# Patient Record
Sex: FEMALE | Race: WHITE | NOT HISPANIC OR LATINO | Employment: FULL TIME | URBAN - METROPOLITAN AREA
[De-identification: names, ages, dates, MRNs, and addresses within clinical notes are randomized per-mention and may not be internally consistent; named-entity substitution may affect disease eponyms.]

---

## 2017-02-07 ENCOUNTER — OFFICE VISIT (OUTPATIENT)
Dept: OTOLARYNGOLOGY | Facility: CLINIC | Age: 37
End: 2017-02-07
Payer: COMMERCIAL

## 2017-02-07 VITALS
SYSTOLIC BLOOD PRESSURE: 128 MMHG | HEART RATE: 83 BPM | DIASTOLIC BLOOD PRESSURE: 86 MMHG | TEMPERATURE: 99 F | WEIGHT: 209 LBS

## 2017-02-07 DIAGNOSIS — F17.200 SMOKER: ICD-10-CM

## 2017-02-07 DIAGNOSIS — R51.9 WORSENING HEADACHES: ICD-10-CM

## 2017-02-07 DIAGNOSIS — J30.89 PERENNIAL ALLERGIC RHINITIS, UNSPECIFIED ALLERGIC RHINITIS TRIGGER: Primary | ICD-10-CM

## 2017-02-07 PROCEDURE — 99999 PR PBB SHADOW E&M-NEW PATIENT-LVL III: CPT | Mod: PBBFAC,,, | Performed by: ORTHOPAEDIC SURGERY

## 2017-02-07 PROCEDURE — 99204 OFFICE O/P NEW MOD 45 MIN: CPT | Mod: S$GLB,,, | Performed by: ORTHOPAEDIC SURGERY

## 2017-02-07 RX ORDER — MEDROXYPROGESTERONE ACETATE 150 MG/ML
150 INJECTION, SUSPENSION INTRAMUSCULAR
COMMUNITY

## 2017-02-07 RX ORDER — IBUPROFEN 800 MG/1
800 TABLET ORAL
Refills: 0 | COMMUNITY
Start: 2017-02-01

## 2017-02-07 NOTE — MR AVS SNAPSHOT
[facility]  [address] [address]  Phone: [phone]  Fax: [phone]                  Marleny Guajardo   2017 7:45 AM   Office Visit    Description:  Female : 1980   Provider:  Sharon Chawla MD   Department:  [facility]           Reason for Visit     Sinus Problem           Diagnoses this Visit        Comments    Perennial allergic rhinitis, unspecified allergic rhinitis trigger    -  Primary     Worsening headaches         Smoker                To Do List           Future Appointments        Provider Department Dept Phone    2017 10:00 AM [facility]GY CLINIC, ENT [facility] [phone]    2017 11:30 AM Sharon Chawla MD [facility] [phone]      Goals (5 Years of Data)     None      Follow-Up and Disposition     Follow-up and Disposition History      [facility]     [facility] Care [facility] -  Assistance  Registered nurses in the [facility] provide clinical advisement, health education, appointment booking, and other advisory services.  Call for this free service at [phone].             Medications           Message regarding Medications     Verify the changes and/or additions to your medication regime listed below are the same as discussed with your clinician today.  If any of these changes or additions are incorrect, please notify your healthcare provider.             Verify that the below list of medications is an accurate representation of the medications you are currently taking.  If none reported, the list may be blank. If incorrect, please contact your healthcare provider. Carry this list with you in case of emergency.           Current Medications     GUAIFEN/PHENYLEPH/ACETAMINOPHN (TYLENOL SINUS SEVERE ORAL) Take by mouth as needed.    ibuprofen (ADVIL,MOTRIN) 800 MG tablet Take 800 mg by mouth as needed.    medroxyPROGESTERone (DEPO-PROVERA) 150 mg/mL injection Inject 150 mg into the muscle every 3 (three) months.            Clinical Reference Information           Your Vitals Were     BP Pulse Temp Weight          128/86 (BP Location: Left arm) 83 98.8 °F (37.1 °C) (Tympanic) 94.8 kg (208 lb 15.9 oz)        Blood Pressure          Most Recent Value    BP  128/86      Allergies as of 2/7/2017     No Known Allergies      Immunizations Administered on Date of Encounter - 2/7/2017     None      [facility] Sign-Up     Activating your [facility] account is as easy as 1-2-3!     1) Visit [email or web address], select Sign Up Now, enter this activation code and your date of birth, then select Next.  [email or web address]  Expires: 3/24/2017  8:36 AM      2) Create a username and password to use when you visit [facility] in the future and select a security question in case you lose your password and select Next.    3) Enter your e-mail address and click Sign Up!    Additional Information  If you have questions, please e-mail [email or web address] or call [phone] to talk to our [facility] staff. Remember, [facility] is NOT to be used for urgent needs. For medical emergencies, dial 911.         Smoking Cessation     If you would like to quit smoking:   You may be eligible for free services if you are a [place] resident and started smoking cigarettes before September 1, 1988.  Call the Smoking Cessation [employer] ([facility]) toll free at (239) [phone] or (613) 050-2755.   Call 1-800-QUIT-NOW if you do not meet the above criteria.            Language Assistance Services     ATTENTION: Language assistance services are available, free of charge. Please call [phone].      ATENCIÓN: Si habla español, tiene a mayers disposición servicios gratuitos de asistencia lingüística. Llame al [phone].     CHÚ Ý: N?u b?n nói Ti?ng Vi?t, có các d?ch v? h? tr? ngôn ng? mi?n phí dành cho b?n. G?i s? [phone].         [facility] complies with applicable Federal civil rights laws and does not discriminate on the basis of race, color, national origin, age,  disability, or sex.

## 2017-02-07 NOTE — PROGRESS NOTES
Subjective:       Patient ID: Marleny Guajardo is a 36 y.o. female.    Chief Complaint: Sinus Problem    HPI Comments: Patient is a very pleasant 36 year old female here to see me today for evaluation of her sinuses and allergies.  She has long had symptoms, for many years.  She is currently taking a multi-symptom OTC medication, but is still having symptoms.  Her primary concerns is nasal drainage and nasal obstruction.  She sneezes, but only at work.  She has symptoms nearly all year, and has been worse the last six months.  She has no pets at home.  She also notes severe headaches that she relates to her sinuses, had a bad headache yesterday afternoon.  She was on a nasal spray in the past, Flonase followed by an additional one, but did not find much benefit.  She has tried different OTC antihistamines, but has not found those to be helpful as well.  She has no history of asthma.  She has never had allergy testing done.  She would estimate that she requires antibiotics twice yearly for sinusitis, but has been over a year since she required (has been on frequent antibiotics for diverticulitis).  Her last imaging was last year at [facility] and per the patient it did not show any significant sinus disease at that time.  She does smoke, approximately 1/4 pack daily for 20 years.  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  She follows with [facility] as well for recent issues with vestibular neuritis of the left ear.    Review of Systems   Constitutional: Positive for fatigue. Negative for chills, fever and unexpected weight change.   HENT: Positive for congestion, hearing loss, postnasal drip, rhinorrhea and tinnitus. Negative for dental problem, ear discharge, ear pain, facial swelling, nosebleeds, sinus pressure, sneezing, sore throat, trouble swallowing and voice change.    Eyes: Negative for redness, itching and visual disturbance.    Respiratory: Positive for cough (when she lays down to go to bed). Negative for choking, shortness of breath and wheezing.    Cardiovascular: Negative for chest pain and palpitations.   Gastrointestinal: Negative for abdominal pain.        No reflux.   Musculoskeletal: Negative for gait problem.   Skin: Negative for rash.   Allergic/Immunologic: Positive for environmental allergies.   Neurological: Positive for dizziness, light-headedness and headaches.       Objective:      Physical Exam   Constitutional: She is oriented to person, place, and time. She appears well-developed and well-nourished. No distress.   HENT:   Head: Normocephalic and atraumatic.   Right Ear: Tympanic membrane, external ear and ear canal normal.   Left Ear: Tympanic membrane, external ear and ear canal normal.   Nose: Mucosal edema and rhinorrhea present. No nasal deformity or septal deviation. No epistaxis. Right sinus exhibits no maxillary sinus tenderness and no frontal sinus tenderness. Left sinus exhibits no maxillary sinus tenderness and no frontal sinus tenderness.   Mouth/Throat: Uvula is midline, oropharynx is clear and moist and mucous membranes are normal. Mucous membranes are not pale and not dry. No dental caries. No oropharyngeal exudate or posterior oropharyngeal erythema.   Eyes: Conjunctivae, EOM and lids are normal. Pupils are equal, round, and reactive to light. Right eye exhibits no chemosis. Left eye exhibits no chemosis. Right conjunctiva is not injected. Left conjunctiva is not injected. No scleral icterus. Right eye exhibits normal extraocular motion and no nystagmus. Left eye exhibits normal extraocular motion and no nystagmus.   Neck: Trachea normal and phonation normal. No tracheal tenderness present. No tracheal deviation present. No thyroid mass and no thyromegaly present.   Cardiovascular: Intact distal pulses.    Pulmonary/Chest: Effort normal. No stridor. No respiratory distress.   Abdominal: She exhibits no  distension.   Lymphadenopathy:        Head (right side): No submental, no submandibular, no preauricular, no posterior auricular and no occipital adenopathy present.        Head (left side): No submental, no submandibular, no preauricular, no posterior auricular and no occipital adenopathy present.     She has no cervical adenopathy.   Neurological: She is alert and oriented to person, place, and time. No cranial nerve deficit.   Skin: Skin is warm and dry. No rash noted. No erythema.   Psychiatric: She has a normal mood and affect. Her behavior is normal.       Assessment:       1. Perennial allergic rhinitis, unspecified allergic rhinitis trigger    2. Worsening headaches    3. Smoker        Plan:       1.  AR:  Patient has recently been on a trial of maximal medical therapy, but continues to have symptoms.  At this point, the next step would be to proceed with allergy skin testing.  Risks and benefits were discussed with the patient, including the risk of an allergic reaction to the testing.  In the most severe cases, this reaction could cause a life threatening anaphylaxis that would require treatment in clinic.  I reviewed her medications to ensure no contraindication to testing, and she will need to stop oral antihistamines prior.  The patient was given a handout with detailed instructions.  We also briefly discussed that pending the allergy testing results, she may be a candidate for specific immunotherapy.  There are different methods of desensitization therapy, including both subcutaneous and sublingual options.  Will discuss further once allergy testing is complete and results are available.  2.  Worsening headaches:  Will address allergies as above.  If headache continues after allergies are under better control, then may consider repeat evaluation with Neurology.  3.  Smoker:  Discussed with the patient the importance of stopping smoking, and that if they choose to continue to smoke it will make treating  their nasal drainage more difficult.  Smoking irritates the lining of the nose, increasing nasal secretions and swelling. The nose becomes less able to cleanse itself and more susceptible to allergens. Smoking and secondhand smoke is associated with significant nasal and sinus disease and symptoms.  I would recommend irrigation with hypertonic saline solution and daily use of a nasal steroid spray.  Unfortunately, sometimes permanent damage has been done to the nasal mucosa (similar to lung damage) and nasal drainage may not improve even after stopping smoking.  I would not recommend pursuing any sort of allergy testing or treatment while the patient is smoking, as it is unlikely to have significant benefit.

## 2017-02-22 ENCOUNTER — OFFICE VISIT (OUTPATIENT)
Dept: OTOLARYNGOLOGY | Facility: CLINIC | Age: 37
End: 2017-02-22
Payer: COMMERCIAL

## 2017-02-22 ENCOUNTER — CLINICAL SUPPORT (OUTPATIENT)
Dept: OTOLARYNGOLOGY | Facility: CLINIC | Age: 37
End: 2017-02-22
Payer: COMMERCIAL

## 2017-02-22 VITALS
TEMPERATURE: 98 F | HEIGHT: 65 IN | RESPIRATION RATE: 16 BRPM | BODY MASS INDEX: 34.82 KG/M2 | WEIGHT: 209 LBS | HEART RATE: 77 BPM | SYSTOLIC BLOOD PRESSURE: 128 MMHG | DIASTOLIC BLOOD PRESSURE: 90 MMHG

## 2017-02-22 DIAGNOSIS — F17.200 SMOKER: ICD-10-CM

## 2017-02-22 DIAGNOSIS — R51.9 WORSENING HEADACHES: ICD-10-CM

## 2017-02-22 DIAGNOSIS — J30.89 PERENNIAL ALLERGIC RHINITIS, UNSPECIFIED ALLERGIC RHINITIS TRIGGER: Primary | ICD-10-CM

## 2017-02-22 DIAGNOSIS — J30.9 ALLERGIC RHINITIS, UNSPECIFIED ALLERGIC RHINITIS TRIGGER, UNSPECIFIED RHINITIS SEASONALITY: ICD-10-CM

## 2017-02-22 PROCEDURE — 95024 IQ TESTS W/ALLERGENIC XTRCS: CPT | Mod: S$GLB,,, | Performed by: ORTHOPAEDIC SURGERY

## 2017-02-22 PROCEDURE — 99999 PR PBB SHADOW E&M-EST. PATIENT-LVL III: CPT | Mod: PBBFAC,,, | Performed by: ORTHOPAEDIC SURGERY

## 2017-02-22 PROCEDURE — 99999 PR PBB SHADOW E&M-EST. PATIENT-LVL I: CPT | Mod: PBBFAC,,,

## 2017-02-22 PROCEDURE — 95004 PERQ TESTS W/ALRGNC XTRCS: CPT | Mod: S$GLB,,, | Performed by: ORTHOPAEDIC SURGERY

## 2017-02-22 PROCEDURE — 99214 OFFICE O/P EST MOD 30 MIN: CPT | Mod: 25,S$GLB,, | Performed by: ORTHOPAEDIC SURGERY

## 2017-02-22 NOTE — PROGRESS NOTES
Subjective:       Patient ID: Marleny Guajardo is a 36 y.o. female.    Chief Complaint: Follow-up (Allergy Testing)    HPI Comments: Patient is a very pleasant 36 year old female here to see me today for evaluation of her sinuses and allergies.  She has long had symptoms, for many years.  She is currently taking a multi-symptom OTC medication, but is still having symptoms.  Her primary concerns is nasal drainage and nasal obstruction.  She sneezes, but only at work.  She has symptoms nearly all year, and has been worse the last six months.  She has no pets at home.  She also notes severe headaches that she relates to her sinuses, had a bad headache yesterday afternoon.  She was on a nasal spray in the past, Flonase followed by an additional one, but did not find much benefit.  She has tried different OTC antihistamines, but has not found those to be helpful as well.  She has no history of asthma.  She has never had allergy testing done.  She would estimate that she requires antibiotics twice yearly for sinusitis, but has been over a year since she required (has been on frequent antibiotics for diverticulitis).  Her last imaging was last year at [facility] and per the patient it did not show any significant sinus disease at that time.  She does smoke, approximately 1/4 pack daily for 20 years.  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  She follows with [facility] as well for recent issues with vestibular neuritis of the left ear.  She continues to have facial pressure as well as headaches.    Review of Systems   Constitutional: Positive for fatigue. Negative for chills, fever and unexpected weight change.   HENT: Positive for congestion, hearing loss, postnasal drip, rhinorrhea and tinnitus. Negative for dental problem, ear discharge, ear pain, facial swelling, nosebleeds, sinus pressure, sneezing, sore throat, trouble swallowing and voice change.     Eyes: Negative for redness, itching and visual disturbance.   Respiratory: Positive for cough (when she lays down to go to bed). Negative for choking, shortness of breath and wheezing.    Cardiovascular: Negative for chest pain and palpitations.   Gastrointestinal: Negative for abdominal pain.        No reflux.   Musculoskeletal: Negative for gait problem.   Skin: Negative for rash.   Allergic/Immunologic: Positive for environmental allergies.   Neurological: Positive for dizziness, light-headedness and headaches.       Objective:      Physical Exam   Constitutional: She is oriented to person, place, and time. She appears well-developed and well-nourished. No distress.   HENT:   Head: Normocephalic and atraumatic.   Right Ear: Tympanic membrane, external ear and ear canal normal.   Left Ear: Tympanic membrane, external ear and ear canal normal.   Nose: Mucosal edema and rhinorrhea present. No nasal deformity or septal deviation. No epistaxis. Right sinus exhibits no maxillary sinus tenderness and no frontal sinus tenderness. Left sinus exhibits no maxillary sinus tenderness and no frontal sinus tenderness.   Mouth/Throat: Uvula is midline, oropharynx is clear and moist and mucous membranes are normal. Mucous membranes are not pale and not dry. No dental caries. No oropharyngeal exudate or posterior oropharyngeal erythema.   Eyes: Conjunctivae, EOM and lids are normal. Pupils are equal, round, and reactive to light. Right eye exhibits no chemosis. Left eye exhibits no chemosis. Right conjunctiva is not injected. Left conjunctiva is not injected. No scleral icterus. Right eye exhibits normal extraocular motion and no nystagmus. Left eye exhibits normal extraocular motion and no nystagmus.   Neck: Trachea normal and phonation normal. No tracheal tenderness present. No tracheal deviation present. No thyroid mass and no thyromegaly present.   Cardiovascular: Intact distal pulses.    Pulmonary/Chest: Effort normal. No  stridor. No respiratory distress.   Abdominal: She exhibits no distension.   Lymphadenopathy:        Head (right side): No submental, no submandibular, no preauricular, no posterior auricular and no occipital adenopathy present.        Head (left side): No submental, no submandibular, no preauricular, no posterior auricular and no occipital adenopathy present.     She has no cervical adenopathy.   Neurological: She is alert and oriented to person, place, and time. No cranial nerve deficit.   Skin: Skin is warm and dry. No rash noted. No erythema.   Psychiatric: She has a normal mood and affect. Her behavior is normal.       Following published and departmental protocols, the patient underwent allergy skin testing.  After the nurse reviewed her medical history and obtained written consent, she then performed modified quantitative skin testing on the ENT panel of aeroallergens.  See nurse's notes for full detail.  Briefly, she had reactions to nearly all allergens tested in all classes of aeroallergens.        Assessment:       1. Perennial allergic rhinitis, unspecified allergic rhinitis trigger    2. Worsening headaches    3. Smoker        Plan:       1.  AR: Patient's recent allergy testing was reviewed in great detail.  We discussed that continued use of allergy medications, both oral and intranasal spray, as well as lifestyle and home modifications specific to their allergies remains a viable option.  However, if we are unable to achieve adequate symptom control, I would then consider specific immunotherapy.  We had a long discussion regarding immunotherapy, both sublingual and subcutaneous.  Specifically, we discussed that subcutaneous requires weekly injections and does have a small but real risk of anaphylaxis, approximately 1:1-2 million.  Sublingual is administered at home, and while it does have multiple studies documenting both safety and efficacy, it is not FDA approved.  Either treatment required a  commitment to generally 2-3 years of therapy.  The patient with consider these options, and will let me know how they would like to proceed.   2.  Worsening headaches:  Her main concern today is her increased headaches rather than her allergy symptoms.  While headaches can be related to allergies, I would recommend an evaluation with Neurology prior to starting immunotherapy with the primary goal of improving headaches.  If her neurologist feels that her headaches are related to her allergies could then consider a six month trial of immunotherapy.  She has previously seen Dr. Cody at [facility], she will call to make an appointment with him and will let me now how she would like to proceed.  3.  Smoker:  She is making progress with stopping smoking, and has decreased by at least 50% her use of cigarettes.

## 2017-02-22 NOTE — PROGRESS NOTES
After two patient identifiers and written consent were obtained, patient's allergies and medications were reviewed and changes were made as necessary.  Patient confirmed she does not have asthma, has not been experienced wheezing within the last seven days, has not used an inhaler within the last seven days, is not currently on a beta blocker, and is not on any other medications that are contraindicated for allergy testing.    The patient's forearms were cleansed with alcohol, and the allergen extracts were applied using the Skintestor OMNI device according to departmental procedures and guidelines.  Further intradermal skin testing was then completed using dilutions from allergens on the diagnostic panel according to departmental procedures and guidelines.    Results obtained from the Modified Quantitative Testing (MQT), including skin endpoint titration (SET) are as follows:    Histamine: MTII Wheal Size 9    Alternaria: MTII Wheal Size 0, ID Dil# to be Tested 2, ID Wheal Size 9, End Point Dilution# 3    Aspergillus: MTII Wheal Size 3, ID Dil# to be Tested 5, ID Wheal Size 7, End Point Dilution# 5    Fusarium: MTII Wheal Size 3, ID Dil# to be Tested 5, ID Wheal Size 9, End Point Dilution# 6    Cladosporium Sphaer: MTII Wheal Size 5, ID Dil# to be Tested 5, ID Wheal Size 3, End Point Dilution# 4    Cladosporium Herb: MTII Wheal Size 3, ID Dil# to be Tested 5, ID Wheal Size 7, End Point Dilution# 5    Penicillium Alicia: MTII Wheal Size 3, ID Dil# to be Tested 5, ID Wheal Size 9, End Point Dilution# 6    Mucor Race: MTII Wheal Size 5, ID Dil# to be Tested 5, ID Wheal Size 5, End Point Dilution# 4    Bipolaris: MTII Wheal Size 3, ID Dil# to be Tested 5, ID Wheal Size 7, End Point Dilution# 5    Glycerine: MTII Wheal Size 0        American Elm: MTII Wheal Size 3, ID Dil# to be Tested 5, ID Wheal Size 7, End Point Dilution# 5    Nye: MTII Wheal Size 7, ID Dil# to be Tested 5, ID Wheal Size 9, End Point Dilution# 6      Eastern/American Ganado: MTII Wheal Size 7, ID Dil# to be Tested 5, ID Wheal Size 5, End Point Dilution# 4    Pecan:  MTII Wheal Size 5, ID Dil# to be Tested 5, ID Wheal Size 11, End Point Dilution# 6     Box Elder: MTII Wheal Size 0, ID Dil# to be Tested 2, ID Wheal Size 13, End Point Dilution# 3    Thanh: MTII Wheal Size 7, ID Dil# to be Tested 5, ID Wheal Size 7, End Point Dilution# 5     Rolette Birch: MTII Wheal Size 0, ID Dil# to be Tested 2, ID Wheal Size 0, End Point Dilution# 0    Red [place]:    MTII Wheal Size 5, ID Dil# to be Tested 5, ID Wheal Size 7, End Point Dilution# 5    Bald [place]:  MTII Wheal Size 5, ID Dil# to be Tested 5, ID Wheal Size 9, End Point Dilution# 6    Red [place]: MTII Wheal Size 0, ID Dil# to be Tested 2, ID Wheal Size 13, End Point Dilution# 3        Short Ragweed: MTII Wheal Size 5, ID Dil# to be Tested 5, ID Wheal Size 5, End Point Dilution# 4    English Plantain: MTII Wheal Size 3, ID Dil# to be Tested 5, ID Wheal Size 3, End Point Dilution# 4    Cutler Elder: MTII Wheal Size 0, ID Dil# to be Tested 2, ID Wheal Size 5, End Point Dilution# 0    Mugwort Jaime: MTII Wheal Size 0, ID Dil# to be Tested 2, ID Wheal Size 9, End Point Dilution# 3    Dock-Sorrel Mix: MTII Wheal Size 0, ID Dil# to be Tested 2, ID Wheal Size 5, End Point Dilution# 0    Spiny Pigweed: MTII Wheal Size 7, ID Dil# to be Tested 5, ID Wheal Size 3, End Point Dilution# 4    Baccharis Weed: MTII Wheal Size 3, ID Dil# to be Tested 5, ID Wheal Size 7, End Point Dilution# 5    Cocklebur Weed: MTII Wheal Size 0, ID Dil# to be Tested 2, ID Wheal Size 9, End Point Dilution# 3    Lambs Quarter: MTII Wheal Size 0, ID Dil# to be Tested 2, ID Wheal Size 5, End Point Dilution# 0    Nettle Weed:  MTII Wheal Size 5, ID Dil# to be Tested 5, ID Wheal Size 9, End Point Dilution# 6        Lenny Grass: MTII Wheal Size 7, ID Dil# to be Tested 5, ID Wheal Size 5, End Point Dilution# 4    Bermuda Grass: MTII Wheal Size 5, ID  Dil# to be Tested 5, ID Wheal Size 7, End Point Dilution# 5    Srinivasan Grass: MTII Wheal Size 7, ID Dil# to be Tested 5, ID Wheal Size 7, End Point Dilution# 5    [place] Blue Grass:  MTII Wheal Size 7, ID Dil# to be Tested 5, ID Wheal Size 5, End Point Dilution# 4    Farinae Mite:  MTII Wheal Size 7, ID Dil# to be Tested 5, ID Wheal Size 9, End Point Dilution# 6    Pteronyssinus Mite:  MTII Wheal Size 5, ID Dil# to be Tested 5, ID Wheal Size 9, End Point Dilution# 6    Cockroach:  MTII Wheal Size 5, ID Dil# to be Tested 5, ID Wheal Size 0, End Point Dilution# 4    Cat Hair: MTII Wheal Size 0, ID Dil# to be Tested 2, ID Wheal Size 5, End Point Dilution# 0    Dog Epithelia:  MTII Wheal Size 0, ID Dil# to be Tested 2, ID Wheal Size 5, End Point Dilution# 0    Feather Mix:  MTII Wheal Size 0, ID Dil# to be Tested 2, ID Wheal Size 3, End Point Dilution# 0    Patient tolerated testing well. No complaints of pain, discomfort, or shortness of breath. Information on allergens and immunotherapy options provided to patient. Patient placed in an exam room to discuss test findings with Dr. Chawla. Instructed to contact our office with any questions or concerns. Patient verbalized understanding.

## 2017-12-18 ENCOUNTER — TELEPHONE (OUTPATIENT)
Dept: OPHTHALMOLOGY | Facility: CLINIC | Age: 37
End: 2017-12-18